# Patient Record
Sex: MALE | Race: OTHER
[De-identification: names, ages, dates, MRNs, and addresses within clinical notes are randomized per-mention and may not be internally consistent; named-entity substitution may affect disease eponyms.]

---

## 2019-04-27 ENCOUNTER — HOSPITAL ENCOUNTER (EMERGENCY)
Dept: HOSPITAL 80 - FED | Age: 39
Discharge: HOME | End: 2019-04-27
Payer: COMMERCIAL

## 2019-04-27 VITALS — SYSTOLIC BLOOD PRESSURE: 134 MMHG | DIASTOLIC BLOOD PRESSURE: 87 MMHG
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
## 2019-04-27 DIAGNOSIS — S05.02XA: Primary | ICD-10-CM

## 2019-04-27 NOTE — EDPHY
H & P


Stated Complaint: l eye irritation


Time Seen by Provider: 04/27/19 04:52


HPI/ROS: 





Chief Complaint:  Left eye pain





HPI:  38-year-old male woke with left eye pain this morning.  He he has been 

rubbing it since.  Pain is getting worse.  Has a history of Lasix surgery in 

the past.  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  No 

grinding or hammering.  No recent trauma.  He does complaining of blurry vision 

in that eye.  No fevers or chills.  No discharge.





ROS:  10 systems were reviewed and were negative except those elements noted in 

the HPI.





PMH:  Lasik surgery





Social History: No smoking, no alcohol,  no recreational drug use





Family History: non-contributory





Physical Exam:


Gen: Awake, Alert, No Distress


Eye Exam


     EOM: Intact OU


     Visual Fields: Intact OU


     Pupil: Equal, round and reactive to light and accomodation OU


     External: Lids, lashes and margins normal OU


     Slit Lamp; patient has a large answer conjunctival defect Iris normal, 

large corneal abrasion, Anterior chambers clear without cells or flare, no 

hyphema, normal angles


     Fluorosceine exam:  Significant for seen uptake on the entire anterior 

aspect of the cornea


     Tonometry: 





- Personal History


Current Tetanus/Diphtheria Vaccine: Yes


Current Tetanus Diphtheria and Acellular Pertussis (TDAP): Yes





- Medical/Surgical History


Hx Asthma: No


Hx Chronic Respiratory Disease: No


Hx Diabetes: No


Hx Cardiac Disease: No


Hx Renal Disease: No


Hx Cirrhosis: No


Hx Alcoholism: No


Hx HIV/AIDS: No


Hx Splenectomy or Spleen Trauma: No





- Social History


Smoking Status: Never smoked


Constitutional: 





 Initial Vital Signs











Temperature (C)  36.5 C   04/27/19 04:47


 


Heart Rate  66   04/27/19 04:47


 


Respiratory Rate  18   04/27/19 04:47


 


Blood Pressure  134/87 H  04/27/19 04:47


 


O2 Sat (%)  97   04/27/19 04:47








 











O2 Delivery Mode               Room Air














Allergies/Adverse Reactions: 


 





No Known Allergies Allergy (Unverified 11/04/13 17:48)


 








Home Medications: 














 Medication  Instructions  Recorded


 


Miscellaneous Medical Supply [NO  11/04/13





HOME MEDS]  


 


Erythromycin 0.5% 1 jesse LEFTEYE Q4HRS WHILE AWAKE 3 04/27/19





 Days  opht.oint 














Medical Decision Making


ED Course/Re-evaluation: 





Patient's large corneal abrasion.  Will treat with antibiotic ointments and 

diluted proparacaine, follow up with Ophthalmology.





- Data Points


Medications Given: 





 








Discontinued Medications





Fluorescein Sodium (Bioglo)  1 mg OP EDNOW ONE


   Stop: 04/27/19 04:54


   Last Admin: 04/27/19 04:55 Dose:  1 mg


Proparacaine HCl (Alcaine 0.5%)  1 drops OP EDNOW ONE


   Stop: 04/27/19 04:51


   Last Admin: 04/27/19 04:55 Dose:  1 drop








Departure





- Departure


Disposition: Home, Routine, Self-Care


Clinical Impression: 


 Corneal abrasion





Condition: Good


Instructions:  Corneal Abrasion (ED)


Additional Instructions: 


Apply the eye ointment every 4 hr while awake for the next 3 days.


You may use the diluted numbing drops 1 drop every 30 min while awake for the 

next 2 days only.


Follow up with ophthalmologist in 2-3 days for re-evaluation.


Referrals: 


Sanford Jett MD [Medical Doctor] - As per Instructions


Prescriptions: 


Erythromycin 0.5% 1 jesse LEFTEYE Q4HRS WHILE AWAKE 3 Days  opht.oint